# Patient Record
Sex: FEMALE | Race: WHITE | Employment: FULL TIME | URBAN - METROPOLITAN AREA
[De-identification: names, ages, dates, MRNs, and addresses within clinical notes are randomized per-mention and may not be internally consistent; named-entity substitution may affect disease eponyms.]

---

## 2017-02-06 ENCOUNTER — TELEPHONE (OUTPATIENT)
Dept: FAMILY MEDICINE | Facility: CLINIC | Age: 62
End: 2017-02-06

## 2017-02-06 NOTE — TELEPHONE ENCOUNTER
Reason for Call:  Form, our goal is to have forms completed with 72 hours, however, some forms may require a visit or additional information.    Type of letter, form or note:  medical    Who is the form from?: [facility]    Where did the form come from: form was faxed in    What clinic location was the form placed at?: [facility]    Where the form was placed: 's Box: Jose Roberto Gonzalez MD    What number is listed as a contact on the form?: Call Patient at [phone] to clarify where this form is to be sent or if she would like to pick it up?       Additional comments: Physician Statement - Ability to Work    Call taken on 2/6/2017 at 12:03 PM by Yfn Min

## 2017-02-27 DIAGNOSIS — E03.4 HYPOTHYROIDISM DUE TO ACQUIRED ATROPHY OF THYROID: ICD-10-CM

## 2017-02-28 RX ORDER — LEVOTHYROXINE SODIUM 175 UG/1
TABLET ORAL
Qty: 30 TABLET | Refills: 0 | Status: SHIPPED | OUTPATIENT
Start: 2017-02-28 | End: 2017-03-31

## 2017-02-28 NOTE — TELEPHONE ENCOUNTER
evothyroxine (SYNTHROID/LEVOTHROID) 175 MCG tablet     Last Written Prescription Date: 7/28/2016  Last Quantity: 90, # refills: 1  Last Office Visit with [facility], [facility] or [facility] prescribing provider: 4/18/2016        TSH   Date Value Ref Range Status   02/19/2016 1.04 0.40 - 5.00 mU/L Final

## 2017-03-31 DIAGNOSIS — E03.4 HYPOTHYROIDISM DUE TO ACQUIRED ATROPHY OF THYROID: ICD-10-CM

## 2017-03-31 NOTE — TELEPHONE ENCOUNTER
levothyroxine (SYNTHROID/LEVOTHROID) 175 MCG tablet     Last Written Prescription Date: 2/28/17  Last Quantity: 30, # refills: 0  Last Office Visit with [facility], [facility] or [facility] prescribing provider: 4/18/16        TSH   Date Value Ref Range Status   02/19/2016 1.04 0.40 - 5.00 mU/L Final

## 2017-04-03 ENCOUNTER — TELEPHONE (OUTPATIENT)
Dept: NURSING | Facility: CLINIC | Age: 62
End: 2017-04-03

## 2017-04-03 NOTE — TELEPHONE ENCOUNTER
Patient had vadim refill. She is due for a physical  I left a non detailed message for her to call back.  Debby Ruano RN- Triage [employer]

## 2017-04-03 NOTE — TELEPHONE ENCOUNTER
"Call Type: Triage Call    Presenting Problem: \"I am returning a call from the clinic.\" Per  epic:Patient had vadim refill. She is due for a physical  I left a  non detailed message for her to call back.  Debby Ruano RN- Triage  [employer]  I advised appt.   ?Per pt.\" Well that's not going to  happen, I'll just have to be tired and fat, I don't have insurance  and that's not happening.\"  Triage Note:  Guideline Title: Information Only Call; No Symptom Triage (Adult)  Recommended Disposition: Call Provider When Office is Open  Original Inclination: Would have called clinic  Override Disposition:  Intended Action: Call PCP/HCP  Physician Contacted: No  Requesting information and provider is best resource; no triage required. ?  YES  Requesting regular office appointment ? NO  Sign(s) or symptom(s) associated with a diagnosed condition or with a new illness  ? NO  Requesting information about provider, services or community resources ? NO  Call back to complete assessment/clarification of information from prior caller to  complete triage ? NO  Physician Instructions:  Care Advice:  "

## 2017-04-05 NOTE — TELEPHONE ENCOUNTER
"Clinic Action Needed:Yes, follow up with patient    Reason for Call: \"I'm returning Debby's call from clinic\". Advised Angela that clinic was calling her regarding her refill request on Thyroid medication. Explained that she received one vadim refill, however she is due for a physical in order to get a script for her medication. Angela became very agitated and belligerent stating that she simply cannot afford to come into the clinic and she will not do it.  She said that she is \"homeless and cannot take it anymore\". She also made a statement that \"I will drive my car into the [place]\". Before I could say anything further, she hung up.     Routed to: [facility] Patient Care Team 2    Carolyn Ricardo RN  [facility]        "

## 2017-04-10 RX ORDER — LEVOTHYROXINE SODIUM 175 UG/1
TABLET ORAL
Qty: 30 TABLET | Refills: 0 | Status: SHIPPED | OUTPATIENT
Start: 2017-04-10 | End: 2017-10-23

## 2017-04-10 NOTE — TELEPHONE ENCOUNTER
Routing refill request to provider for review/approval because:  Labs not current:  And last OV April 2016 but please read patient comment with last call.  Thank you

## 2017-04-26 ENCOUNTER — TELEPHONE (OUTPATIENT)
Dept: FAMILY MEDICINE | Facility: CLINIC | Age: 62
End: 2017-04-26

## 2017-04-26 NOTE — TELEPHONE ENCOUNTER
Panel Management Review      Patient has the following on her problem list:     Asthma review     ACT Total Scores 1/22/2016   ACT TOTAL SCORE -   ASTHMA ER VISITS -   ASTHMA HOSPITALIZATIONS -   ACT TOTAL SCORE (Goal Greater than or Equal to 20) 8   In the past 12 months, how many times did you visit the emergency room for your asthma without being admitted to the hospital? 0   In the past 12 months, how many times were you hospitalized overnight because of your asthma? 0      1. Is Asthma diagnosis on the Problem List? Yes    2. Is Asthma listed on Health Maintenance? Yes    3. Patient is due for:  ACT      Composite cancer screening  Chart review shows that this patient is due/due soon for the following Pap Smear  Summary:    Patient is due/failing the following:   ACT and PAP    Action needed:   Patient needs office visit for PAP. and Patient needs to do ACT.    Type of outreach:    Sent [employer] message and mailed letter.    Questions for provider review:    None                                                                                                                                    Princess FLORENCIO Bosch CMA

## 2017-04-26 NOTE — LETTER
[facility]  [address]  [place] [id number] [phone]                                                                                                           Angela Salguero  [address] [place] [address]    April 26, 2017          Dear Angela Salguero,      We care about your well-being!  In order to ensure we are providing the best quality care, we have reviewed your chart and see that you are due for:     __X__ Physical   __X__ Pap Smear   _____ Mammogram   _____ Diabetes Followup   _____ Hypertension Followup   _____ Cholesterol Followup (Provider Appointment)   _____ Cholesterol Test (Lab-Only Appointment)   __X__ Other: follow up asthma       We can assist you in scheduling these appointments at [phone].    If you have had (or plan to have) any of these tests done at a facility other than [facility] or a [facility], please have the results from these tests sent to your primary physician at [facility].       Sincerely,    Jose Roberto Gonzalez MD

## 2017-05-04 ENCOUNTER — OFFICE VISIT (OUTPATIENT)
Dept: FAMILY MEDICINE | Facility: CLINIC | Age: 62
End: 2017-05-04

## 2017-05-04 VITALS
OXYGEN SATURATION: 97 % | TEMPERATURE: 98.2 F | HEART RATE: 77 BPM | DIASTOLIC BLOOD PRESSURE: 92 MMHG | SYSTOLIC BLOOD PRESSURE: 160 MMHG | RESPIRATION RATE: 15 BRPM

## 2017-05-04 DIAGNOSIS — R30.0 DYSURIA: Primary | ICD-10-CM

## 2017-05-04 DIAGNOSIS — N18.30 CKD (CHRONIC KIDNEY DISEASE) STAGE 3, GFR 30-59 ML/MIN (H): ICD-10-CM

## 2017-05-04 DIAGNOSIS — I10 ESSENTIAL HYPERTENSION, BENIGN: ICD-10-CM

## 2017-05-04 DIAGNOSIS — R10.84 ABDOMINAL PAIN, GENERALIZED: ICD-10-CM

## 2017-05-04 LAB
ALBUMIN UR-MCNC: ABNORMAL MG/DL
APPEARANCE UR: CLEAR
BASOPHILS # BLD AUTO: 0 10E9/L (ref 0–0.2)
BASOPHILS NFR BLD AUTO: 0.3 %
BILIRUB UR QL STRIP: NEGATIVE
COLOR UR AUTO: YELLOW
DIFFERENTIAL METHOD BLD: ABNORMAL
EOSINOPHIL # BLD AUTO: 0.6 10E9/L (ref 0–0.7)
EOSINOPHIL NFR BLD AUTO: 4.5 %
ERYTHROCYTE [DISTWIDTH] IN BLOOD BY AUTOMATED COUNT: 13.5 % (ref 10–15)
ERYTHROCYTE [SEDIMENTATION RATE] IN BLOOD BY WESTERGREN METHOD: 4 MM/H (ref 0–30)
GLUCOSE UR STRIP-MCNC: NEGATIVE MG/DL
HCT VFR BLD AUTO: 45.1 % (ref 35–47)
HGB BLD-MCNC: 14.9 G/DL (ref 11.7–15.7)
HGB UR QL STRIP: ABNORMAL
KETONES UR STRIP-MCNC: NEGATIVE MG/DL
LEUKOCYTE ESTERASE UR QL STRIP: NEGATIVE
LYMPHOCYTES # BLD AUTO: 4.5 10E9/L (ref 0.8–5.3)
LYMPHOCYTES NFR BLD AUTO: 32.7 %
MCH RBC QN AUTO: 33.9 PG (ref 26.5–33)
MCHC RBC AUTO-ENTMCNC: 33 G/DL (ref 31.5–36.5)
MCV RBC AUTO: 103 FL (ref 78–100)
MONOCYTES # BLD AUTO: 1 10E9/L (ref 0–1.3)
MONOCYTES NFR BLD AUTO: 7.5 %
NEUTROPHILS # BLD AUTO: 7.6 10E9/L (ref 1.6–8.3)
NEUTROPHILS NFR BLD AUTO: 55 %
NITRATE UR QL: NEGATIVE
NON-SQ EPI CELLS #/AREA URNS LPF: ABNORMAL /LPF
PH UR STRIP: 5.5 PH (ref 5–7)
PLATELET # BLD AUTO: 262 10E9/L (ref 150–450)
RBC # BLD AUTO: 4.39 10E12/L (ref 3.8–5.2)
RBC #/AREA URNS AUTO: ABNORMAL /HPF (ref 0–2)
SP GR UR STRIP: 1.02 (ref 1–1.03)
URN SPEC COLLECT METH UR: ABNORMAL
UROBILINOGEN UR STRIP-ACNC: 1 EU/DL (ref 0.2–1)
WBC # BLD AUTO: 13.8 10E9/L (ref 4–11)
WBC #/AREA URNS AUTO: ABNORMAL /HPF (ref 0–2)

## 2017-05-04 PROCEDURE — 81001 URINALYSIS AUTO W/SCOPE: CPT | Performed by: PHYSICIAN ASSISTANT

## 2017-05-04 PROCEDURE — 87086 URINE CULTURE/COLONY COUNT: CPT | Performed by: PHYSICIAN ASSISTANT

## 2017-05-04 PROCEDURE — 86140 C-REACTIVE PROTEIN: CPT | Performed by: PHYSICIAN ASSISTANT

## 2017-05-04 PROCEDURE — 99214 OFFICE O/P EST MOD 30 MIN: CPT | Performed by: PHYSICIAN ASSISTANT

## 2017-05-04 PROCEDURE — 36415 COLL VENOUS BLD VENIPUNCTURE: CPT | Performed by: PHYSICIAN ASSISTANT

## 2017-05-04 PROCEDURE — 80053 COMPREHEN METABOLIC PANEL: CPT | Performed by: PHYSICIAN ASSISTANT

## 2017-05-04 PROCEDURE — 85652 RBC SED RATE AUTOMATED: CPT | Performed by: PHYSICIAN ASSISTANT

## 2017-05-04 PROCEDURE — 85025 COMPLETE CBC W/AUTO DIFF WBC: CPT | Performed by: PHYSICIAN ASSISTANT

## 2017-05-04 RX ORDER — CIPROFLOXACIN 500 MG/1
500 TABLET, FILM COATED ORAL 2 TIMES DAILY
Qty: 10 TABLET | Refills: 0 | Status: SHIPPED | OUTPATIENT
Start: 2017-05-04 | End: 2017-05-09

## 2017-05-04 NOTE — PROGRESS NOTES
SUBJECTIVE:                                                    Angela Salguero is a 61 year old female who presents to clinic today for the following health issues:    URINARY TRACT SYMPTOMS     Onset: 2 wks?    Description:   Painful urination (Dysuria): no   Blood in urine (Hematuria): no   Delay in urine (Hesitency): no     Intensity: moderate    Progression of Symptoms:  worsening    Accompanying Signs & Symptoms:  Fever/chills: no   Flank pain YES- LBP, left side  Nausea and vomiting: YES- nausea  Any vaginal symptoms: none; urine odor  Abdominal/Pelvic Pain: yes-burning    History:   History of frequent UTI's: YES  History of kidney stones: YES- possibly, has kidney infection years ago  Sexually Active: no   Possibility of pregnancy: No    Precipitating factors:   n/a         Therapies Tried and outcome: [facility] clinic was turned away due to severity of symptoms.     LBP that is bilateral and acute on chronic in the past few days without flank pain. She describes a burning sensation in the bilateral lower abdomen that is worse immediately following urination. She denies symptoms of diarrhea, constipation, pain before or following BM, nausea, vomiting. She notes very malodorous urine and darkened color over the past few days also.         ROS:  C: Negative for fever, chills, recent change in weight  Skin: Negative for worrisome rashes or lesions  Resp: Negative for significant cough or SOB  CV: Negative for chest pain or peripheral edema  GI: Negative for nausea, abdominal pain, heartburn, or change in bowel habits  MS: Negative for significant arthralgias or myalgias      PFSH: hx previous UTI.        Patient Active Problem List   Diagnosis     COPD (chronic obstructive pulmonary disease): spirometry FEV1/FVC= 82% 7-17-14     GERD (gastroesophageal reflux disease)     Essential hypertension, benign     Hypothyroidism due to acquired atrophy of thyroid     Sinusitis, chronic     Atopic rhinitis      Personal history of tobacco use, presenting hazards to health-[age]/o @ 1ppd=40 pk yr hx-conts in 4-16      Hypercholesterolemia     CKD (chronic kidney disease) stage 3, GFR 30-59 ml/min     Current Outpatient Prescriptions   Medication     ciprofloxacin (CIPRO) 500 MG tablet     levothyroxine (SYNTHROID/LEVOTHROID) 175 MCG tablet     lisinopril (PRINIVIL,ZESTRIL) 20 MG tablet     amLODIPine (NORVASC) 5 MG tablet     ipratropium - albuterol 0.5 mg/2.5 mg/3 mL (DUONEB) 0.5-2.5 (3) MG/3ML nebulization     albuterol (2.5 MG/3ML) 0.083% nebulizer solution     mometasone-formoterol (DULERA) 200-5 MCG/ACT oral inhaler     albuterol (PROAIR HFA, PROVENTIL HFA, VENTOLIN HFA) 108 (90 BASE) MCG/ACT inhaler     fluticasone (FLONASE) 50 MCG/ACT nasal spray     loratadine (CLARITIN) 10 MG tablet     No current facility-administered medications for this visit.        OBJECTIVE:                                                    BP (!) 160/92 (BP Location: Left arm, Patient Position: Chair, Cuff Size: Adult Regular)  Pulse 77  Temp 98.2  F (36.8  C) (Tympanic)  Resp 15  LMP  (LMP Unknown)  SpO2 97%  There is no height or weight on file to calculate BMI.  GENERAL: healthy, alert, in no acute distress  HENT: Mucous mebranes moist.  RESP: lungs clear to auscultation - no rales, no rhonchi, no wheezes  CV: regular rate and rhythm, normal S1 S2.  No peripheral edema.  ABDOMEN: soft, nontender, no hepatosplenomegaly or masses. Normal bowel sounds in all four quadrants.  MS: no gross deformities noted.  No CVA tenderness. No paralumbar tenderness.  SKIN: no suspicious lesions, no rashes  PSYCH: Alert and oriented times 3;  Able to articulate logical thoughts. Affect is normal.    Diagnostic test results:   Results for orders placed or performed in visit on 05/04/17 (from the past 24 hour(s))   UA reflex to Microscopic   Result Value Ref Range    Color Urine Yellow     Appearance Urine Clear     Glucose Urine Negative NEG mg/dL     Bilirubin Urine Negative NEG    Ketones Urine Negative NEG mg/dL    Specific Gravity Urine 1.025 1.003 - 1.035    Blood Urine Trace (A) NEG    pH Urine 5.5 5.0 - 7.0 pH    Protein Albumin Urine Trace (A) NEG mg/dL    Urobilinogen Urine 1.0 0.2 - 1.0 EU/dL    Nitrite Urine Negative NEG    Leukocyte Esterase Urine Negative NEG    Source Midstream Urine    Urine Microscopic   Result Value Ref Range    WBC Urine O - 2 0 - 2 /HPF    RBC Urine 2-5 (A) 0 - 2 /HPF    Squamous Epithelial /LPF Urine Few FEW /LPF   CBC with platelets and differential   Result Value Ref Range    WBC 13.8 (H) 4.0 - 11.0 10e9/L    RBC Count 4.39 3.8 - 5.2 10e12/L    Hemoglobin 14.9 11.7 - 15.7 g/dL    Hematocrit 45.1 35.0 - 47.0 %     (H) 78 - 100 fl    MCH 33.9 (H) 26.5 - 33.0 pg    MCHC 33.0 31.5 - 36.5 g/dL    RDW 13.5 10.0 - 15.0 %    Platelet Count 262 150 - 450 10e9/L    Diff Method Automated Method     % Neutrophils 55.0 %    % Lymphocytes 32.7 %    % Monocytes 7.5 %    % Eosinophils 4.5 %    % Basophils 0.3 %    Absolute Neutrophil 7.6 1.6 - 8.3 10e9/L    Absolute Lymphocytes 4.5 0.8 - 5.3 10e9/L    Absolute Monocytes 1.0 0.0 - 1.3 10e9/L    Absolute Eosinophils 0.6 0.0 - 0.7 10e9/L    Absolute Basophils 0.0 0.0 - 0.2 10e9/L   ESR: Erythrocyte sedimentation rate   Result Value Ref Range    Sed Rate 4 0 - 30 mm/h        ASSESSMENT/PLAN:                                                        ICD-10-CM    1. Dysuria R30.0 UA reflex to Microscopic     Urine Microscopic     ciprofloxacin (CIPRO) 500 MG tablet   2. CKD (chronic kidney disease) stage 3, GFR 30-59 ml/min N18.3    3. Essential hypertension, benign I10    4. Abdominal pain, generalized R10.84 Urine Culture Aerobic Bacterial     Comprehensive metabolic panel     CBC with platelets and differential     CRP inflammation     ESR: Erythrocyte sedimentation rate     Vitals are stable and there is no CVA tenderness, complaint is bilateral low back. Her pelvic symptoms are not  typical of UTI, however with the malodorous urine subjective information we will treat for UTI pending further evaluation. Treatment for UTI pending further labs. She will be seen in the ED if symptoms worsen.     Nicole Joy Siegler, PA-C  [facility]

## 2017-05-04 NOTE — NURSING NOTE
"Chief Complaint   Patient presents with     UTI       Initial BP (!) 160/92 (BP Location: Left arm, Patient Position: Chair, Cuff Size: Adult Regular)  Pulse 77  Temp 98.2  F (36.8  C) (Tympanic)  Resp 15  LMP  (LMP Unknown)  SpO2 97% Estimated body mass index is 33.84 kg/(m^2) as calculated from the following:    Height as of 4/18/16: 5' 2\" (1.575 m).    Weight as of 4/18/16: 185 lb (83.9 kg).  Medication Reconciliation: complete    "

## 2017-05-04 NOTE — MR AVS SNAPSHOT
After Visit Summary   5/4/2017    Angela Salguero    MRN: [id number]           Patient Information     Date Of Birth          1955        Visit Information        Provider Department      5/4/2017 4:30 PM Siegler, Nicole Joy, PA-C [facility]        Today's Diagnoses     Dysuria    -  1    CKD (chronic kidney disease) stage 3, GFR 30-59 ml/min        Essential hypertension, benign        Abdominal pain, generalized           Follow-ups after your visit        Who to contact     If you have questions or need follow up information about today's clinic visit or your schedule please contact [facility] directly at [phone].  Normal or non-critical lab and imaging results will be communicated to you by [employer], letter or phone within 4 business days after the clinic has received the results. If you do not hear from us within 7 days, please contact the clinic through [employer] or phone. If you have a critical or abnormal lab result, we will notify you by phone as soon as possible.  Submit refill requests through [employer] or call your pharmacy and they will forward the refill request to us. Please allow 3 business days for your refill to be completed.          Additional Information About Your Visit        MyChart Information     [employer] gives you secure access to your electronic health record. If you see a primary care provider, you can also send messages to your care team and make appointments. If you have questions, please call your primary care clinic.  If you do not have a primary care provider, please call [phone] and they will assist you.        Care EveryWhere ID     This is your Care EveryWhere ID. This could be used by other organizations to access your [facility] medical records  [id number]        Your Vitals Were     Pulse Temperature Respirations Last Period Pulse Oximetry       77 98.2  F (36.8  C) (Tympanic) 15 (LMP  Unknown) 97%        Blood Pressure from Last 3 Encounters:   05/04/17 (!) 160/92   04/18/16 130/82   04/08/16 130/78    Weight from Last 3 Encounters:   04/18/16 185 lb (83.9 kg)   04/08/16 185 lb (83.9 kg)   02/29/16 185 lb (83.9 kg)              We Performed the Following     CBC with platelets and differential     Comprehensive metabolic panel     CRP inflammation     ESR: Erythrocyte sedimentation rate     UA reflex to Microscopic     Urine Culture Aerobic Bacterial     Urine Microscopic          Today's Medication Changes          These changes are accurate as of: 5/4/17  5:11 PM.  If you have any questions, ask your nurse or doctor.               Start taking these medicines.        Dose/Directions    ciprofloxacin 500 MG tablet   Commonly known as:  CIPRO   Used for:  Dysuria   Started by:  Siegler, Nicole Joy, PA-C        Dose:  500 mg   Take 1 tablet (500 mg) by mouth 2 times daily for 5 days   Quantity:  10 tablet   Refills:  0            Where to get your medicines      These medications were sent to [place] [address] [place] [facility] [address]  [address] [address]     Phone:  [phone]     ciprofloxacin 500 MG tablet                Primary Care Provider Office Phone # Fax #    Jose Roberto Gonzalez [phone]       [facility] [address] [facility] [address]        Thank you!     Thank you for choosing [facility]  for your care. Our goal is always to provide you with excellent care. Hearing back from our patients is one way we can continue to improve our services. Please take a few minutes to complete the written survey that you may receive in the mail after your visit with us. Thank you!             Your Updated Medication List - Protect others around you: Learn how to safely use, store and throw away your medicines at www.disposemymeds.org.          This list is accurate as of: 5/4/17  5:11 PM.  Always use your  most recent med list.                   Brand Name Dispense Instructions for use    * albuterol (2.5 MG/3ML) 0.083% neb solution     2 Box    INHALE ONE VIAL VIA NEBULIZER THREE TIMES DAILY AS NEEDED       * albuterol 108 (90 BASE) MCG/ACT Inhaler    PROAIR HFA/PROVENTIL HFA/VENTOLIN HFA    1 Inhaler    Inhale 2 puffs into the lungs every 6 hours       amLODIPine 5 MG tablet    NORVASC    90 tablet    TAKE ONE TABLET BY MOUTH ONE TIME DAILY       ciprofloxacin 500 MG tablet    CIPRO    10 tablet    Take 1 tablet (500 mg) by mouth 2 times daily for 5 days       CLARITIN 10 MG tablet   Generic drug:  loratadine      Take 10 mg by mouth daily.       fluticasone 50 MCG/ACT spray    FLONASE    1 Package    Spray 2 sprays into both nostrils daily       ipratropium - albuterol 0.5 mg/2.5 mg/3 mL 0.5-2.5 (3) MG/3ML neb solution    DUONEB    100 vial    Take 1 vial (3 mLs) by nebulization every 6 hours as needed for shortness of breath / dyspnea or wheezing       levothyroxine 175 MCG tablet    SYNTHROID/LEVOTHROID    30 tablet    TAKE ONE TABLET BY MOUTH ONE TIME DAILY       lisinopril 20 MG tablet    PRINIVIL/ZESTRIL    30 tablet    TAKE ONE TABLET BY MOUTH TWICE DAILY. NEEDS TO MAKE APPOINTMENT FOR MORE REFILLS       mometasone-formoterol 200-5 MCG/ACT oral inhaler    DULERA    1 Inhaler    Inhale 2 puffs into the lungs 2 times daily       * Notice:  This list has 2 medication(s) that are the same as other medications prescribed for you. Read the directions carefully, and ask your doctor or other care provider to review them with you.

## 2017-05-05 DIAGNOSIS — I10 ESSENTIAL HYPERTENSION, BENIGN: ICD-10-CM

## 2017-05-05 LAB
ALBUMIN SERPL-MCNC: 3.7 G/DL (ref 3.4–5)
ALP SERPL-CCNC: 116 U/L (ref 40–150)
ALT SERPL W P-5'-P-CCNC: 21 U/L (ref 0–50)
ANION GAP SERPL CALCULATED.3IONS-SCNC: 6 MMOL/L (ref 3–14)
AST SERPL W P-5'-P-CCNC: 7 U/L (ref 0–45)
BILIRUB SERPL-MCNC: 0.5 MG/DL (ref 0.2–1.3)
BUN SERPL-MCNC: 27 MG/DL (ref 7–30)
CALCIUM SERPL-MCNC: 8.6 MG/DL (ref 8.5–10.1)
CHLORIDE SERPL-SCNC: 111 MMOL/L (ref 94–109)
CO2 SERPL-SCNC: 28 MMOL/L (ref 20–32)
CREAT SERPL-MCNC: 1.2 MG/DL (ref 0.52–1.04)
CRP SERPL-MCNC: 6.8 MG/L (ref 0–8)
GFR SERPL CREATININE-BSD FRML MDRD: 46 ML/MIN/1.7M2
GLUCOSE SERPL-MCNC: 102 MG/DL (ref 70–99)
POTASSIUM SERPL-SCNC: 4 MMOL/L (ref 3.4–5.3)
PROT SERPL-MCNC: 6.6 G/DL (ref 6.8–8.8)
SODIUM SERPL-SCNC: 145 MMOL/L (ref 133–144)

## 2017-05-05 NOTE — TELEPHONE ENCOUNTER
I LVM for Angela with her lab results and we are pending urine culture. She was asked to call and update us on her condition as we wait for the urine culture. Nicole Joy Siegler, PA-C

## 2017-05-05 NOTE — LETTER
[facility]  [address]  [address]  [facility] [place] [facility] [id number] [phone]                                                                                                           Angela Salguero  [address]  [place] [address]    May 10, 2017          Dear Angela Salguero,    Your urine culture did not grow bacteria, if symptoms persist please return to clinic for further evaluation.    Results for orders placed or performed in visit on 05/04/17   UA reflex to Microscopic   Result Value Ref Range    Color Urine Yellow     Appearance Urine Clear     Glucose Urine Negative NEG mg/dL    Bilirubin Urine Negative NEG    Ketones Urine Negative NEG mg/dL    Specific Gravity Urine 1.025 1.003 - 1.035    Blood Urine Trace (A) NEG    pH Urine 5.5 5.0 - 7.0 pH    Protein Albumin Urine Trace (A) NEG mg/dL    Urobilinogen Urine 1.0 0.2 - 1.0 EU/dL    Nitrite Urine Negative NEG    Leukocyte Esterase Urine Negative NEG    Source Midstream Urine    Urine Microscopic   Result Value Ref Range    WBC Urine O - 2 0 - 2 /HPF    RBC Urine 2-5 (A) 0 - 2 /HPF    Squamous Epithelial /LPF Urine Few FEW /LPF   Comprehensive metabolic panel   Result Value Ref Range    Sodium 145 (H) 133 - 144 mmol/L    Potassium 4.0 3.4 - 5.3 mmol/L    Chloride 111 (H) 94 - 109 mmol/L    Carbon Dioxide 28 20 - 32 mmol/L    Anion Gap 6 3 - 14 mmol/L    Glucose 102 (H) 70 - 99 mg/dL    Urea Nitrogen 27 7 - 30 mg/dL    Creatinine 1.20 (H) 0.52 - 1.04 mg/dL    GFR Estimate 46 (L) >60 mL/min/1.7m2    GFR Estimate If Black 55 (L) >60 mL/min/1.7m2    Calcium 8.6 8.5 - 10.1 mg/dL    Bilirubin Total 0.5 0.2 - 1.3 mg/dL    Albumin 3.7 3.4 - 5.0 g/dL    Protein Total 6.6 (L) 6.8 - 8.8 g/dL    Alkaline Phosphatase 116 40 - 150 U/L    ALT 21 0 - 50 U/L    AST 7 0 - 45 U/L   CBC with platelets and differential   Result Value Ref Range    WBC 13.8 (H) 4.0 - 11.0 10e9/L    RBC Count 4.39 3.8  - 5.2 10e12/L    Hemoglobin 14.9 11.7 - 15.7 g/dL    Hematocrit 45.1 35.0 - 47.0 %     (H) 78 - 100 fl    MCH 33.9 (H) 26.5 - 33.0 pg    MCHC 33.0 31.5 - 36.5 g/dL    RDW 13.5 10.0 - 15.0 %    Platelet Count 262 150 - 450 10e9/L    Diff Method Automated Method     % Neutrophils 55.0 %    % Lymphocytes 32.7 %    % Monocytes 7.5 %    % Eosinophils 4.5 %    % Basophils 0.3 %    Absolute Neutrophil 7.6 1.6 - 8.3 10e9/L    Absolute Lymphocytes 4.5 0.8 - 5.3 10e9/L    Absolute Monocytes 1.0 0.0 - 1.3 10e9/L    Absolute Eosinophils 0.6 0.0 - 0.7 10e9/L    Absolute Basophils 0.0 0.0 - 0.2 10e9/L   CRP inflammation   Result Value Ref Range    CRP Inflammation 6.8 0.0 - 8.0 mg/L   ESR: Erythrocyte sedimentation rate   Result Value Ref Range    Sed Rate 4 0 - 30 mm/h   Urine Culture Aerobic Bacterial   Result Value Ref Range    Specimen Description Midstream Urine     Culture Micro No growth     Micro Report Status FINAL 05/06/2017          Sincerely,    Nicole Siegler, PA

## 2017-05-06 LAB
BACTERIA SPEC CULT: NO GROWTH
MICRO REPORT STATUS: NORMAL
SPECIMEN SOURCE: NORMAL

## 2017-05-06 ASSESSMENT — ASTHMA QUESTIONNAIRES: ACT_TOTALSCORE: 12

## 2017-05-09 NOTE — TELEPHONE ENCOUNTER
Urine culture was negative; did not grow any bacteria. We should discuss whether she has symptoms continuing. Nicole Joy Siegler, PA-C

## 2017-05-10 NOTE — TELEPHONE ENCOUNTER
Please send patient a letter describing that her urine culture did not grow bacteria and that if her symptoms are persisting she should return to clinic for further evaluation. We have attempted to reach her twice. This encounter can be closed after production of the letter. Thanks! Nicole Joy Siegler, PA-C

## 2017-05-16 NOTE — TELEPHONE ENCOUNTER
"Angela is calling today for labs. Tita has not looked at them yet.   She has been off her antibiotics for UTI since the 10th  The urinary burning and frequency have decreased.  The odor has resolved.  She has a swollen lymph node and it is very tender.  Right by public bone, top of thigh.  She does not know how long it has james there.   She is feeling much better but she is calling today with concern over lymph node.   She is taking a 7 day Monistat for ? Yeast infection. \"I'm not sure if I have one\".   She is on her 3rd day.      Triage call [phone] with Tita's advice  Debby Ruano RN- Triage [employer]      "

## 2017-05-16 NOTE — TELEPHONE ENCOUNTER
I did try contacting her with lab results. They were not clinically significant for further concern except slightly reduced kidney function which she has had in previous tests. I would like her to return to clinic for further discussion if she continues to have symptoms or NEW symptoms, which it seems she does have (concern for lymph node swelling) and require evaluation.   Please notify her.

## 2017-05-17 NOTE — TELEPHONE ENCOUNTER
Pt was informed of providers message. States she does not have insurance and cannot afford OV. Would like PCP to know she had CMP-kidney test done. Asked if PCP would like her to continue taking lisinopril. Ok to leave a detailed message with provider response.

## 2017-05-18 NOTE — TELEPHONE ENCOUNTER
Called patient on mobile and left detailed message as described below.  Mirian Carroll RN  05/18/17  9:49 AM

## 2017-05-18 NOTE — TELEPHONE ENCOUNTER
Kidney function test did show moderate decrease. Ok to continue with current Lisinopril dose but we should not increase

## 2017-05-19 RX ORDER — LISINOPRIL 20 MG/1
TABLET ORAL
Qty: 90 TABLET | Refills: 0 | Status: SHIPPED | OUTPATIENT
Start: 2017-05-19 | End: 2017-08-14

## 2017-05-19 NOTE — TELEPHONE ENCOUNTER
Patient called requesting refill on lisinopril.       lisinopril (PRINIVIL,ZESTRIL) 20 MG tablet  Last Written Prescription Date: 11/21/2016  Last Fill Quantity: 30, # refills: 0  Last Office Visit with G, P or [facility] prescribing provider: 05/04/2017       Potassium   Date Value Ref Range Status   05/04/2017 4.0 3.4 - 5.3 mmol/L Final     Creatinine   Date Value Ref Range Status   05/04/2017 1.20 (H) 0.52 - 1.04 mg/dL Final     BP Readings from Last 3 Encounters:   05/04/17 (!) 160/92   04/18/16 130/82   04/08/16 130/78     Routing refill request to provider for review/approval because:  Break on medication   pleaseadvice on dose and qty of refills approved.

## 2017-08-14 DIAGNOSIS — I10 ESSENTIAL HYPERTENSION, BENIGN: ICD-10-CM

## 2017-08-14 RX ORDER — LISINOPRIL 20 MG/1
TABLET ORAL
Qty: 90 TABLET | Refills: 3 | Status: SHIPPED | OUTPATIENT
Start: 2017-08-14 | End: 2019-03-01

## 2017-10-23 DIAGNOSIS — E03.4 HYPOTHYROIDISM DUE TO ACQUIRED ATROPHY OF THYROID: ICD-10-CM

## 2017-10-24 RX ORDER — LEVOTHYROXINE SODIUM 175 UG/1
TABLET ORAL
Qty: 30 TABLET | Refills: 0 | Status: SHIPPED | OUTPATIENT
Start: 2017-10-24 | End: 2018-03-23

## 2018-02-21 ENCOUNTER — CARE COORDINATION (OUTPATIENT)
Dept: CARE COORDINATION | Facility: CLINIC | Age: 63
End: 2018-02-21

## 2018-02-21 DIAGNOSIS — R30.0 DYSURIA: Primary | ICD-10-CM

## 2018-02-21 NOTE — PROGRESS NOTES
"Clinic Care Coordination Contact        Patient calling. States he has urinary burning, urgency. Feeling \"miserable.\"  Patient has no insurance so is wondering if she can leave a urine specimen and if she has an infection could you please send an Rx  To [facility] in [place].     Sammie Bledsoe RN, [facility] - Care Coordinator     2/21/2018    2:24 PM  [phone]    "

## 2018-02-21 NOTE — PROGRESS NOTES
I put order into EPIC for a UA/UC.  She needs to give urine specimen then I can treat if the urine looks suspicious for infection

## 2018-02-22 ENCOUNTER — TELEPHONE (OUTPATIENT)
Dept: FAMILY MEDICINE | Facility: CLINIC | Age: 63
End: 2018-02-22

## 2018-02-22 DIAGNOSIS — R30.0 DYSURIA: ICD-10-CM

## 2018-02-22 LAB
ALBUMIN UR-MCNC: 30 MG/DL
APPEARANCE UR: ABNORMAL
BACTERIA #/AREA URNS HPF: ABNORMAL /HPF
BILIRUB UR QL STRIP: NEGATIVE
COLOR UR AUTO: YELLOW
GLUCOSE UR STRIP-MCNC: NEGATIVE MG/DL
HGB UR QL STRIP: NEGATIVE
KETONES UR STRIP-MCNC: NEGATIVE MG/DL
LEUKOCYTE ESTERASE UR QL STRIP: NEGATIVE
NITRATE UR QL: POSITIVE
NON-SQ EPI CELLS #/AREA URNS LPF: ABNORMAL /LPF
PH UR STRIP: 5.5 PH (ref 5–7)
RBC #/AREA URNS AUTO: ABNORMAL /HPF
SOURCE: ABNORMAL
SP GR UR STRIP: 1.02 (ref 1–1.03)
UROBILINOGEN UR STRIP-ACNC: 0.2 EU/DL (ref 0.2–1)
WBC #/AREA URNS AUTO: ABNORMAL /HPF

## 2018-02-22 PROCEDURE — 81001 URINALYSIS AUTO W/SCOPE: CPT | Performed by: FAMILY MEDICINE

## 2018-02-22 PROCEDURE — 87086 URINE CULTURE/COLONY COUNT: CPT | Performed by: FAMILY MEDICINE

## 2018-02-22 PROCEDURE — 87186 SC STD MICRODIL/AGAR DIL: CPT | Performed by: FAMILY MEDICINE

## 2018-02-24 LAB
BACTERIA SPEC CULT: ABNORMAL
BACTERIA SPEC CULT: ABNORMAL
SPECIMEN SOURCE: ABNORMAL

## 2018-02-24 RX ORDER — CIPROFLOXACIN 500 MG/1
500 TABLET, FILM COATED ORAL 2 TIMES DAILY
Qty: 14 TABLET | Refills: 0 | Status: SHIPPED | OUTPATIENT
Start: 2018-02-24 | End: 2019-03-01

## 2018-03-23 DIAGNOSIS — E03.4 HYPOTHYROIDISM DUE TO ACQUIRED ATROPHY OF THYROID: ICD-10-CM

## 2018-03-23 RX ORDER — LEVOTHYROXINE SODIUM 175 UG/1
175 TABLET ORAL DAILY
Qty: 30 TABLET | Refills: 1 | Status: SHIPPED | OUTPATIENT
Start: 2018-03-23 | End: 2019-03-01

## 2018-12-17 ENCOUNTER — TELEPHONE (OUTPATIENT)
Dept: FAMILY MEDICINE | Facility: CLINIC | Age: 63
End: 2018-12-17

## 2018-12-17 NOTE — TELEPHONE ENCOUNTER
Yes, I don't know if one of the other providers will be here late enough today to do it since I'll probably be gone and she probably doesn't want to talk to me since I denied her request.

## 2018-12-17 NOTE — TELEPHONE ENCOUNTER
"Patient reports she doesn's have access to use her phone or go to the library to use a computer. States she just lost her job and just wants to \"pee in a cup\". Expressed she is on a lot of pain and \"is ok if I die \". Pt hang up the call.   "

## 2018-12-17 NOTE — TELEPHONE ENCOUNTER
Reason for call:  Patient reporting a symptom    Symptom or request: uti    Duration (how long have symptoms been present): few days    Have you been treated for this before? Yes    Additional comments: pt would like to come in and leave a ua.  She does not want a dr appt has she currently has no insurance.       Phone Number patient can be reached at:  Home number on file [phone] (home)    Best Time:  asap    Can we leave a detailed message on this number:  YES    Call taken on 12/17/2018 at 11:28 AM by BRIAN MORENO

## 2018-12-17 NOTE — TELEPHONE ENCOUNTER
It is inappropriate to see her without an office visit as she needs an exam as well.  There are certain protocols we have to follow for her safety and just getting a urine sample without the information we get in a visit is not sufficient.

## 2018-12-17 NOTE — TELEPHONE ENCOUNTER
Noted. Writer suggested [facility] or do e-visit before call was disconnected but she declined. Routing message to care coordinator for further advice on resources.

## 2018-12-17 NOTE — TELEPHONE ENCOUNTER
Could she try [facility]? I believe you can access that without insurance and it is $49. I can also provide a short list of low cost clinics but they would probably be similar cost to [facility]. Would you like me to reach out?     She could try [facility] at [phone]  [facility] at [phone]  [facility] at [phone]    But [facility] closed at 4:30 and the others close at 5pm. Urgent Care[facility] or [facility] would be her best bet tonight.

## 2019-01-01 ENCOUNTER — OFFICE VISIT (OUTPATIENT)
Dept: FAMILY MEDICINE | Facility: CLINIC | Age: 64
End: 2019-01-01

## 2019-01-01 VITALS
TEMPERATURE: 99.1 F | RESPIRATION RATE: 16 BRPM | SYSTOLIC BLOOD PRESSURE: 148 MMHG | HEART RATE: 104 BPM | OXYGEN SATURATION: 95 % | DIASTOLIC BLOOD PRESSURE: 84 MMHG

## 2019-01-01 DIAGNOSIS — I10 ESSENTIAL HYPERTENSION, BENIGN: ICD-10-CM

## 2019-01-01 DIAGNOSIS — J01.00 ACUTE NON-RECURRENT MAXILLARY SINUSITIS: ICD-10-CM

## 2019-01-01 DIAGNOSIS — N30.00 ACUTE CYSTITIS WITHOUT HEMATURIA: ICD-10-CM

## 2019-01-01 DIAGNOSIS — J40 BRONCHITIS: Primary | ICD-10-CM

## 2019-01-01 LAB
ALBUMIN UR-MCNC: 100 MG/DL
APPEARANCE UR: ABNORMAL
BACTERIA #/AREA URNS HPF: ABNORMAL /HPF
BACTERIA SPEC CULT: ABNORMAL
BACTERIA SPEC CULT: ABNORMAL
BILIRUB UR QL STRIP: NEGATIVE
COLOR UR AUTO: YELLOW
GLUCOSE UR STRIP-MCNC: NEGATIVE MG/DL
HGB UR QL STRIP: ABNORMAL
KETONES UR STRIP-MCNC: 15 MG/DL
LEUKOCYTE ESTERASE UR QL STRIP: NEGATIVE
NITRATE UR QL: POSITIVE
PH UR STRIP: 5.5 PH (ref 5–7)
RBC #/AREA URNS AUTO: ABNORMAL /HPF
SOURCE: ABNORMAL
SP GR UR STRIP: >1.03 (ref 1–1.03)
SPECIMEN SOURCE: ABNORMAL
UROBILINOGEN UR STRIP-ACNC: 0.2 EU/DL (ref 0.2–1)
WBC #/AREA URNS AUTO: ABNORMAL /HPF

## 2019-01-01 PROCEDURE — 87086 URINE CULTURE/COLONY COUNT: CPT | Performed by: FAMILY MEDICINE

## 2019-01-01 PROCEDURE — 87186 SC STD MICRODIL/AGAR DIL: CPT | Performed by: FAMILY MEDICINE

## 2019-01-01 PROCEDURE — 99214 OFFICE O/P EST MOD 30 MIN: CPT | Performed by: FAMILY MEDICINE

## 2019-01-01 PROCEDURE — 81001 URINALYSIS AUTO W/SCOPE: CPT | Performed by: FAMILY MEDICINE

## 2019-01-01 PROCEDURE — 87088 URINE BACTERIA CULTURE: CPT | Performed by: FAMILY MEDICINE

## 2019-01-01 RX ORDER — CLINDAMYCIN HCL 150 MG
150 CAPSULE ORAL 3 TIMES DAILY
Qty: 30 CAPSULE | Refills: 0 | Status: SHIPPED | OUTPATIENT
Start: 2019-01-01 | End: 2019-01-01

## 2019-01-01 RX ORDER — DOXYCYCLINE 100 MG/1
100 CAPSULE ORAL 2 TIMES DAILY
Qty: 20 CAPSULE | Refills: 0 | Status: SHIPPED | OUTPATIENT
Start: 2019-01-01 | End: 2019-01-01

## 2019-01-01 RX ORDER — CIPROFLOXACIN 250 MG/1
250 TABLET, FILM COATED ORAL 2 TIMES DAILY
Qty: 10 TABLET | Refills: 0 | Status: SHIPPED | OUTPATIENT
Start: 2019-01-01 | End: 2019-01-01

## 2019-01-01 RX ORDER — PREDNISONE 20 MG/1
40 TABLET ORAL DAILY
Qty: 10 TABLET | Refills: 0 | Status: SHIPPED | OUTPATIENT
Start: 2019-01-01 | End: 2019-01-01

## 2019-03-01 ENCOUNTER — OFFICE VISIT (OUTPATIENT)
Dept: FAMILY MEDICINE | Facility: CLINIC | Age: 64
End: 2019-03-01

## 2019-03-01 VITALS
HEART RATE: 97 BPM | TEMPERATURE: 97.3 F | OXYGEN SATURATION: 98 % | RESPIRATION RATE: 20 BRPM | SYSTOLIC BLOOD PRESSURE: 144 MMHG | DIASTOLIC BLOOD PRESSURE: 110 MMHG

## 2019-03-01 DIAGNOSIS — J01.00 ACUTE MAXILLARY SINUSITIS, RECURRENCE NOT SPECIFIED: ICD-10-CM

## 2019-03-01 DIAGNOSIS — I10 ESSENTIAL HYPERTENSION, BENIGN: ICD-10-CM

## 2019-03-01 DIAGNOSIS — R30.0 DYSURIA: Primary | ICD-10-CM

## 2019-03-01 DIAGNOSIS — J44.9 CHRONIC OBSTRUCTIVE PULMONARY DISEASE, UNSPECIFIED COPD TYPE (H): ICD-10-CM

## 2019-03-01 DIAGNOSIS — E03.4 HYPOTHYROIDISM DUE TO ACQUIRED ATROPHY OF THYROID: ICD-10-CM

## 2019-03-01 DIAGNOSIS — R82.90 NONSPECIFIC FINDING ON EXAMINATION OF URINE: ICD-10-CM

## 2019-03-01 LAB
ALBUMIN UR-MCNC: >=300 MG/DL
APPEARANCE UR: CLEAR
BACTERIA #/AREA URNS HPF: ABNORMAL /HPF
BILIRUB UR QL STRIP: NEGATIVE
COLOR UR AUTO: YELLOW
GLUCOSE UR STRIP-MCNC: NEGATIVE MG/DL
HGB UR QL STRIP: NEGATIVE
KETONES UR STRIP-MCNC: NEGATIVE MG/DL
LEUKOCYTE ESTERASE UR QL STRIP: ABNORMAL
NITRATE UR QL: POSITIVE
NON-SQ EPI CELLS #/AREA URNS LPF: ABNORMAL /LPF
PH UR STRIP: >=9 PH (ref 5–7)
RBC #/AREA URNS AUTO: ABNORMAL /HPF
SOURCE: ABNORMAL
SP GR UR STRIP: 1.01 (ref 1–1.03)
UROBILINOGEN UR STRIP-ACNC: 0.2 EU/DL (ref 0.2–1)
WBC #/AREA URNS AUTO: ABNORMAL /HPF

## 2019-03-01 PROCEDURE — 99213 OFFICE O/P EST LOW 20 MIN: CPT | Performed by: FAMILY MEDICINE

## 2019-03-01 PROCEDURE — 81001 URINALYSIS AUTO W/SCOPE: CPT | Performed by: FAMILY MEDICINE

## 2019-03-01 PROCEDURE — 87086 URINE CULTURE/COLONY COUNT: CPT | Performed by: FAMILY MEDICINE

## 2019-03-01 PROCEDURE — 87186 SC STD MICRODIL/AGAR DIL: CPT | Performed by: FAMILY MEDICINE

## 2019-03-01 PROCEDURE — 87088 URINE BACTERIA CULTURE: CPT | Performed by: FAMILY MEDICINE

## 2019-03-01 RX ORDER — CIPROFLOXACIN 500 MG/1
500 TABLET, FILM COATED ORAL 2 TIMES DAILY
Qty: 28 TABLET | Refills: 0 | Status: SHIPPED | OUTPATIENT
Start: 2019-03-01 | End: 2019-03-25

## 2019-03-01 RX ORDER — LISINOPRIL 20 MG/1
TABLET ORAL
Qty: 90 TABLET | Refills: 3 | Status: SHIPPED | OUTPATIENT
Start: 2019-03-01 | End: 2020-01-01

## 2019-03-01 RX ORDER — LEVOTHYROXINE SODIUM 175 UG/1
175 TABLET ORAL DAILY
Qty: 30 TABLET | Refills: 1 | Status: SHIPPED | OUTPATIENT
Start: 2019-03-01 | End: 2019-05-09

## 2019-03-01 RX ORDER — AMLODIPINE BESYLATE 5 MG/1
5 TABLET ORAL DAILY
Qty: 30 TABLET | Refills: 1 | Status: SHIPPED | OUTPATIENT
Start: 2019-03-01 | End: 2019-01-01

## 2019-03-01 NOTE — PROGRESS NOTES
SUBJECTIVE:   Angela Salguero is a 63 year old female who presents to clinic today for the following health issues:    Pt had been originally scheduled to see Dr Tai today because my schedule was full.  My schedule opened up due to weather cancellations and she asked to be changed to my schedule    URINARY TRACT SYMPTOMS      Duration: since Nov    Description  dysuria, odor and back pain    Intensity:  moderate, severe    Accompanying signs and symptoms:  Fever/chills: no   Flank pain no   Nausea and vomiting: YES-vomiting  Vaginal symptoms: odor  Abdominal/Pelvic Pain: no     History  History of frequent UTI's: no   History of kidney stones: YES  Sexually Active: no   Possibility of pregnancy: No    Precipitating or alleviating factors: None    Therapies tried and outcome: none       RESPIRATORY SYMPTOMS      Duration: 2 months    Description  facial pain/pressure and wheezing    Severity: severe    Accompanying signs and symptoms: UTI    History (predisposing factors):  asthma and tobacco abuse    Precipitating or alleviating factors: None    Therapies tried and outcome:  Nebulizer, Inhaler, Vicks      Hypertension Follow-up      Outpatient blood pressures are not being checked.    Low Salt Diet: no added salt    Pt has been out of her BP medications for at least 3 mo     Asthma Follow-Up    Was ACT completed today?  No      Respiratory symptoms:   Cough: Yes-  worsening   Wheezing: Yes-  Several times daily    Shortness of breath: No    Use of short- acting(rescue) inhaler: yes, also using nebulizer    Taking controlled (daily) meds as prescribed: Yes    ER/UC visits or hospital admissions since last visit: none     Recent asthma triggers that patient is dealing with: upper respiratory infections and smoking     Hypothyroidism Follow-up      Since last visit, patient describes the following symptoms: weight gain of 5 lbs and fatigue    Pt has been out of her Levothyroxine for 2 mo       Amount of  exercise or physical activity: None    Problems taking medications regularly: Yes,  Has been out, did not have insurance    Medication side effects: none    Diet: low salt         Problem list and histories reviewed & adjusted, as indicated.  Additional history: as documented    Labs reviewed in EPIC    Reviewed and updated as needed this visit by clinical staff  Tobacco  Allergies  Meds  Problems  Med Hx  Surg Hx  Fam Hx  Soc Hx        Reviewed and updated as needed this visit by Provider  Tobacco  Allergies  Meds  Problems  Med Hx  Surg Hx  Fam Hx       ROS:  CONSTITUTIONAL:POSITIVE  for fever   ENT/MOUTH: POSITIVE for Hx sinus infections, nasal congestion, postnasal drainage, rhinorrhea-purulent and sinus pressure  RESP:POSITIVE for cough-productive, Hx asthma, Hx COPD and wheezing  CV: NEGATIVE for chest pain, palpitations or peripheral edema  : dysuria and frequency 1-2 hrs  ENDOCRINE: POSITIVE  for HX thyroid disease    OBJECTIVE:                                                    BP (!) 144/110 (BP Location: Left arm, Patient Position: Sitting, Cuff Size: Adult Regular)   Pulse 97   Temp 97.3  F (36.3  C) (Temporal)   Resp 20   LMP  (LMP Unknown)   SpO2 98%   There is no height or weight on file to calculate BMI.  GENERAL APPEARANCE: healthy, alert and no distress  HENT: ear canals and TM's normal, nose and mouth without ulcers or lesions and maxillary sinus tenderness bilateral  NECK: no adenopathy, no asymmetry, masses, or scars, thyroid normal to palpation and no bruits  RESP: rhonchi R mid posterior and L mid posterior, expiratory wheezes R mid posterior and L mid posterior and prolonged expiratory phase  CV: regular rates and rhythm, normal S1 S2, no S3 or S4 and no murmur, click or rub   (female): exam deferred, moderate suprapubic tenderness  MS: no CVA tenderness    Diagnostic test results:  Results for orders placed or performed in visit on 03/01/19 (from the past 24  hour(s))   UA with Microscopic reflex to Culture   Result Value Ref Range    Color Urine Yellow     Appearance Urine Clear     Glucose Urine Negative NEG^Negative mg/dL    Bilirubin Urine Negative NEG^Negative    Ketones Urine Negative NEG^Negative mg/dL    Specific Gravity Urine 1.010 1.003 - 1.035    pH Urine >=9.0 5.0 - 7.0 pH    Protein Albumin Urine >=300 (A) NEG^Negative mg/dL    Urobilinogen Urine 0.2 0.2 - 1.0 EU/dL    Nitrite Urine Positive (A) NEG^Negative    Blood Urine Negative NEG^Negative    Leukocyte Esterase Urine Small (A) NEG^Negative    Source Midstream Urine     WBC Urine 0 - 5 OTO5^0 - 5 /HPF    RBC Urine 2-5 (A) OTO2^O - 2 /HPF    Squamous Epithelial /LPF Urine Moderate (A) FEW^Few /LPF    Bacteria Urine Many (A) NEG^Negative /HPF        ASSESSMENT/PLAN:                                                        ICD-10-CM    1. Dysuria R30.0 UA with Microscopic reflex to Culture     ciprofloxacin (CIPRO) 500 MG tablet   2. Chronic obstructive pulmonary disease, unspecified COPD type (H) J44.9    3. Acute maxillary sinusitis, recurrence not specified J01.00 ciprofloxacin (CIPRO) 500 MG tablet   4. Essential hypertension, benign I10 amLODIPine (NORVASC) 5 MG tablet     lisinopril (PRINIVIL/ZESTRIL) 20 MG tablet   5. Hypothyroidism due to acquired atrophy of thyroid E03.4 levothyroxine (SYNTHROID/LEVOTHROID) 175 MCG tablet       Follow up with Provider - 2 mo    Will treat with Cipro 500 mg twice daily for 2 weeks.  Hopefully that will treat urinary and sinus and respiratory infections  Need to get back on blood pressure medication and Levothyroxine.  Will need to recheck after back on medication  Patient Instructions   Push fluids  Symptomatic treatment.  Will use saline gargles, tylenol and/or advil. Suck on  lozenges as needed. Push fluids. Salt water nasal spray as needed.  Use expectorant such as Mucinex or Robitussin for cough      Jose Roberto Gonzalez MD  [facility]

## 2019-03-01 NOTE — PATIENT INSTRUCTIONS
Push fluids  Symptomatic treatment.  Will use saline gargles, tylenol and/or advil. Suck on  lozenges as needed. Push fluids. Salt water nasal spray as needed.  Use expectorant such as Mucinex or Robitussin for cough

## 2019-03-03 LAB
BACTERIA SPEC CULT: ABNORMAL
SPECIMEN SOURCE: ABNORMAL

## 2019-03-25 ENCOUNTER — OFFICE VISIT (OUTPATIENT)
Dept: FAMILY MEDICINE | Facility: CLINIC | Age: 64
End: 2019-03-25

## 2019-03-25 VITALS
SYSTOLIC BLOOD PRESSURE: 146 MMHG | OXYGEN SATURATION: 96 % | TEMPERATURE: 98.7 F | DIASTOLIC BLOOD PRESSURE: 82 MMHG | RESPIRATION RATE: 20 BRPM | HEART RATE: 95 BPM

## 2019-03-25 DIAGNOSIS — J44.9 CHRONIC OBSTRUCTIVE PULMONARY DISEASE, UNSPECIFIED COPD TYPE (H): ICD-10-CM

## 2019-03-25 DIAGNOSIS — J32.0 CHRONIC MAXILLARY SINUSITIS: ICD-10-CM

## 2019-03-25 DIAGNOSIS — R30.0 DYSURIA: Primary | ICD-10-CM

## 2019-03-25 LAB
ALBUMIN UR-MCNC: 100 MG/DL
APPEARANCE UR: CLEAR
BACTERIA #/AREA URNS HPF: ABNORMAL /HPF
BILIRUB UR QL STRIP: NEGATIVE
COLOR UR AUTO: YELLOW
GLUCOSE UR STRIP-MCNC: NEGATIVE MG/DL
HGB UR QL STRIP: NEGATIVE
KETONES UR STRIP-MCNC: NEGATIVE MG/DL
LEUKOCYTE ESTERASE UR QL STRIP: NEGATIVE
NITRATE UR QL: NEGATIVE
NON-SQ EPI CELLS #/AREA URNS LPF: ABNORMAL /LPF
PH UR STRIP: 5.5 PH (ref 5–7)
RBC #/AREA URNS AUTO: ABNORMAL /HPF
SOURCE: ABNORMAL
SP GR UR STRIP: 1.02 (ref 1–1.03)
UROBILINOGEN UR STRIP-ACNC: 0.2 EU/DL (ref 0.2–1)
WBC #/AREA URNS AUTO: ABNORMAL /HPF

## 2019-03-25 PROCEDURE — 99214 OFFICE O/P EST MOD 30 MIN: CPT | Performed by: FAMILY MEDICINE

## 2019-03-25 PROCEDURE — 81001 URINALYSIS AUTO W/SCOPE: CPT | Performed by: FAMILY MEDICINE

## 2019-03-25 RX ORDER — DOXYCYCLINE 100 MG/1
100 CAPSULE ORAL 2 TIMES DAILY
Qty: 20 CAPSULE | Refills: 0 | Status: SHIPPED | OUTPATIENT
Start: 2019-03-25 | End: 2019-01-01

## 2019-03-25 RX ORDER — CLINDAMYCIN HCL 150 MG
150 CAPSULE ORAL 3 TIMES DAILY
Qty: 30 CAPSULE | Refills: 0 | Status: SHIPPED | OUTPATIENT
Start: 2019-03-25 | End: 2020-01-01

## 2019-03-25 NOTE — PROGRESS NOTES
SUBJECTIVE:   Angela Salguero is a 63 year old female who presents to clinic today for the following health issues:      Improvement in UTI symptoms but continues to have burning sensation.     URINARY TRACT SYMPTOMS      Duration: since Nov    Description  dysuria, odor and back pain    Intensity:  moderate, severe    Accompanying signs and symptoms:  Fever/chills: no   Flank pain no   Nausea and vomiting: YES-vomiting  Vaginal symptoms: odor  Abdominal/Pelvic Pain: no     History  History of frequent UTI's: no   History of kidney stones: YES  Sexually Active: no   Possibility of pregnancy: No    Precipitating or alleviating factors: None    Therapies tried and outcome: Cipro      RESPIRATORY SYMPTOMS      Duration: 3 months    Description  facial pain/pressure and wheezing    Severity: severe    Accompanying signs and symptoms: UTI    History (predisposing factors):  asthma and tobacco abuse    Precipitating or alleviating factors: None    Therapies tried and outcome:  Nebulizer, Inhaler, Vicks    Increased sinus pressure and drainage    Pt very frustrated with trying to care for her aunt.  Aunt has been refusing to eat most foods, has been losing weight.            Problem list and histories reviewed & adjusted, as indicated.  Additional history: as documented    Labs reviewed in EPIC    Reviewed and updated as needed this visit by clinical staff  Tobacco  Allergies  Meds  Med Hx  Surg Hx  Fam Hx  Soc Hx      Reviewed and updated as needed this visit by Provider         ROS:  CONSTITUTIONAL:NEGATIVE for fever, chills, change in weight  ENT/MOUTH: POSITIVE for Hx sinus infections, nasal congestion, postnasal drainage, rhinorrhea-purulent and sinus pressure  RESP:POSITIVE for cough-productive and Hx asthma  : dysuria and frequency 1-2 hrs    OBJECTIVE:                                                    /82 (BP Location: Right arm, Patient Position: Sitting, Cuff Size: Adult Large)   Pulse  95   Temp 98.7  F (37.1  C) (Temporal)   Resp 20   LMP  (LMP Unknown)   SpO2 96%   There is no height or weight on file to calculate BMI.  GENERAL APPEARANCE: healthy, alert and no distress  HENT: ear canals and TM's normal, nose and mouth without ulcers or lesions, oral mucous membranes moist, oropharynx clear and maxillary sinus tenderness bilateral  RESP: rhonchi R mid posterior and L mid posterior and expiratory wheezes R mid posterior and L mid posterior  CV: regular rates and rhythm, normal S1 S2, no S3 or S4 and no murmur, click or rub   (female): exam deferred, no suprapubic tenderness    Diagnostic test results:  Results for orders placed or performed in visit on 03/25/19 (from the past 24 hour(s))   *UA reflex to Microscopic and Culture ([place] and [facility] (except [place] and [place])   Result Value Ref Range    Color Urine Yellow     Appearance Urine Clear     Glucose Urine Negative NEG^Negative mg/dL    Bilirubin Urine Negative NEG^Negative    Ketones Urine Negative NEG^Negative mg/dL    Specific Gravity Urine 1.025 1.003 - 1.035    Blood Urine Negative NEG^Negative    pH Urine 5.5 5.0 - 7.0 pH    Protein Albumin Urine 100 (A) NEG^Negative mg/dL    Urobilinogen Urine 0.2 0.2 - 1.0 EU/dL    Nitrite Urine Negative NEG^Negative    Leukocyte Esterase Urine Negative NEG^Negative    Source Midstream Urine    Urine Microscopic   Result Value Ref Range    WBC Urine 0 - 5 OTO5^0 - 5 /HPF    RBC Urine O - 2 OTO2^O - 2 /HPF    Squamous Epithelial /LPF Urine Few FEW^Few /LPF    Bacteria Urine Few (A) NEG^Negative /HPF        ASSESSMENT/PLAN:                                                        ICD-10-CM    1. Dysuria R30.0 *UA reflex to Microscopic and Culture ([place] and [facility] (except [place] and [place])     Urine Microscopic   2. Chronic maxillary sinusitis J32.0 doxycycline hyclate (VIBRAMYCIN) 100 MG capsule     clindamycin (CLEOCIN) 150 MG capsule   3. Chronic obstructive  pulmonary disease, unspecified COPD type (H) J44.9        Follow up with Provider - 1 mo or as needed    There are no Patient Instructions on file for this visit.    Jose Roberto Gonzalez MD  [facility]

## 2019-10-14 NOTE — PROGRESS NOTES
Subjective     Angela Salguero is a 64 year old female who presents to clinic today for the following health issues:    HPI   Acute Illness   Acute illness concerns: cold  Onset: ongoing    Fever: no    Chills/Sweats: YES    Headache (location?): YES    Sinus Pressure:YES    Conjunctivitis:  YES-     Ear Pain: no    Rhinorrhea: YES    Congestion: YES    Sore Throat: no     Cough: YES-productive of yellow sputum    Wheeze: YES    Decreased Appetite: no    Nausea: no    Vomiting: no    Diarrhea:  no    Dysuria/Freq.: YES    Fatigue/Achiness: YES    Sick/Strep Exposure: YES     Therapies Tried and outcome: na    URINARY TRACT SYMPTOMS      Duration: couple weeks    Description  dysuria, frequency, odor, back pain and cloudy    Intensity:  moderate    Accompanying signs and symptoms:  Fever/chills: no   Flank pain YES  Nausea and vomiting: no   Vaginal symptoms: odor  Abdominal/Pelvic Pain: YES    History  History of frequent UTI's: YES  History of kidney stones: no   Sexually Active: no   Possibility of pregnancy: No    Precipitating or alleviating factors: None    Therapies tried and outcome: none   Outcome: na      Has HTN and hypothyroidism.  Declines labs because she can not afford to do labs; does not have insurance to pay for them.      Reviewed and updated as needed this visit by Provider  Tobacco  Allergies  Meds  Problems  Med Hx  Surg Hx  Fam Hx         Review of Systems   C: NEGATIVE for change in weight  I: NEGATIVE for worrisome rashes, moles or lesions  E: NEGATIVE for vision changes or irritation  CV: NEGATIVE for chest pain, palpitations or peripheral edema  GI: NEGATIVE for nausea, heartburn, or change in bowel habits  H: NEGATIVE for bleeding problems        Objective    BP (!) 148/84   Pulse 104   Temp 99.1  F (37.3  C) (Tympanic)   Resp 16   LMP  (LMP Unknown)   SpO2 95%   There is no height or weight on file to calculate BMI.  Physical Exam   GENERAL: alert and no  distress  EYES: Eyes grossly normal to inspection, PERRL and conjunctivae and sclerae normal  HENT: ear canals and TM's normal, mouth without ulcers or lesions.  +b/l boggy turbinates, no active nasal drainage.  NECK: no adenopathy, no asymmetry, masses, or scars and thyroid normal to palpation  RESP: +rhonchi and expiratory wheezes on auscultation.  No rales.    CV: +tachycardic.  Regular rhythm, normal S1 S2, no S3 or S4, no murmur, click or rub, no peripheral edema and peripheral pulses strong  SKIN: no suspicious lesions or rashes      Diagnostic Test Results:  Labs reviewed in Epic        Assessment & Plan   Problem List Items Addressed This Visit     Essential hypertension, benign      Other Visit Diagnoses     Bronchitis    -  Primary    Relevant Medications    clindamycin (CLEOCIN) 150 MG capsule    doxycycline hyclate (VIBRAMYCIN) 100 MG capsule    predniSONE (DELTASONE) 20 MG tablet    Acute non-recurrent maxillary sinusitis        Relevant Medications    ciprofloxacin (CIPRO) 250 MG tablet    clindamycin (CLEOCIN) 150 MG capsule    doxycycline hyclate (VIBRAMYCIN) 100 MG capsule    predniSONE (DELTASONE) 20 MG tablet    Acute cystitis without hematuria        Relevant Medications    ciprofloxacin (CIPRO) 250 MG tablet    Other Relevant Orders    UA reflex to Microscopic and Culture (Completed)    Urine Microscopic (Completed)    Urine Culture Aerobic Bacterial (Completed)           --COPD/bronchitis flare-up with ongoing tobacco use and sinusitis: worsening  Continue Albuterol PRN and Dulera daily  Has several allergies and intolerances, so only certain med regimens work to help control her COPD flare-ups.  Pt requesting Clindamycin and Doxycyline.  Also requesting Prednisone taper    --HTN: not well controlled.  With hx hypothyroid: unsure how controlled she is due to no recent lab work available.  Takes Amlodipine 5 mg daily, Lisinopril 20 mg daily  Last TSH/T4 done in 2016?  On Levothyroxine 175 mcg  daily  Past due for lab work but pt continues to decline labs--tells me she does not have insurance, so can't afford to pay for labs and take care of her health in general.      Tobacco Cessation:   reports that she has been smoking cigarettes. She has been smoking about 1.00 pack per day. She has never used smokeless tobacco.  Tobacco Cessation Action Plan: Information offered: Patient not interested at this time        See Patient Instructions  Return in about 1 week (around 10/21/2019) for re-check / follow-up.    Dian Tai, DO  [facility]

## 2020-01-01 ENCOUNTER — VIRTUAL VISIT (OUTPATIENT)
Dept: FAMILY MEDICINE | Facility: CLINIC | Age: 65
End: 2020-01-01

## 2020-01-01 ENCOUNTER — TELEPHONE (OUTPATIENT)
Dept: FAMILY MEDICINE | Facility: CLINIC | Age: 65
End: 2020-01-01

## 2020-01-01 ENCOUNTER — OFFICE VISIT (OUTPATIENT)
Dept: FAMILY MEDICINE | Facility: CLINIC | Age: 65
End: 2020-01-01

## 2020-01-01 ENCOUNTER — OFFICE VISIT (OUTPATIENT)
Dept: FAMILY MEDICINE | Facility: CLINIC | Age: 65
End: 2020-01-01
Payer: COMMERCIAL

## 2020-01-01 VITALS
DIASTOLIC BLOOD PRESSURE: 80 MMHG | HEART RATE: 82 BPM | SYSTOLIC BLOOD PRESSURE: 132 MMHG | TEMPERATURE: 98.4 F | OXYGEN SATURATION: 96 %

## 2020-01-01 VITALS
OXYGEN SATURATION: 96 % | DIASTOLIC BLOOD PRESSURE: 80 MMHG | SYSTOLIC BLOOD PRESSURE: 110 MMHG | RESPIRATION RATE: 16 BRPM | HEART RATE: 60 BPM | TEMPERATURE: 98.1 F

## 2020-01-01 DIAGNOSIS — I10 ESSENTIAL HYPERTENSION, BENIGN: ICD-10-CM

## 2020-01-01 DIAGNOSIS — J44.9 CHRONIC OBSTRUCTIVE PULMONARY DISEASE, UNSPECIFIED COPD TYPE (H): ICD-10-CM

## 2020-01-01 DIAGNOSIS — J32.0 CHRONIC MAXILLARY SINUSITIS: Primary | ICD-10-CM

## 2020-01-01 DIAGNOSIS — R30.0 DYSURIA: ICD-10-CM

## 2020-01-01 DIAGNOSIS — J44.1 CHRONIC OBSTRUCTIVE PULMONARY DISEASE WITH ACUTE EXACERBATION (H): ICD-10-CM

## 2020-01-01 DIAGNOSIS — E03.4 HYPOTHYROIDISM DUE TO ACQUIRED ATROPHY OF THYROID: ICD-10-CM

## 2020-01-01 DIAGNOSIS — J44.1 CHRONIC OBSTRUCTIVE PULMONARY DISEASE WITH ACUTE EXACERBATION (H): Primary | ICD-10-CM

## 2020-01-01 DIAGNOSIS — R30.0 DYSURIA: Primary | ICD-10-CM

## 2020-01-01 DIAGNOSIS — J32.0 CHRONIC MAXILLARY SINUSITIS: ICD-10-CM

## 2020-01-01 LAB
ALBUMIN UR-MCNC: 30 MG/DL
ALBUMIN UR-MCNC: ABNORMAL MG/DL
APPEARANCE UR: CLEAR
APPEARANCE UR: CLEAR
BILIRUB UR QL STRIP: NEGATIVE
BILIRUB UR QL STRIP: NEGATIVE
COLOR UR AUTO: YELLOW
COLOR UR AUTO: YELLOW
GLUCOSE UR STRIP-MCNC: NEGATIVE MG/DL
GLUCOSE UR STRIP-MCNC: NEGATIVE MG/DL
HGB UR QL STRIP: NEGATIVE
HGB UR QL STRIP: NEGATIVE
KETONES UR STRIP-MCNC: NEGATIVE MG/DL
KETONES UR STRIP-MCNC: NEGATIVE MG/DL
LEUKOCYTE ESTERASE UR QL STRIP: NEGATIVE
LEUKOCYTE ESTERASE UR QL STRIP: NEGATIVE
NITRATE UR QL: NEGATIVE
NITRATE UR QL: NEGATIVE
NON-SQ EPI CELLS #/AREA URNS LPF: ABNORMAL /LPF
PH UR STRIP: 6 PH (ref 5–7)
PH UR STRIP: 7 PH (ref 5–7)
RBC #/AREA URNS AUTO: ABNORMAL /HPF
RBC #/AREA URNS AUTO: NORMAL /HPF
SOURCE: ABNORMAL
SOURCE: ABNORMAL
SP GR UR STRIP: 1.02 (ref 1–1.03)
SP GR UR STRIP: 1.02 (ref 1–1.03)
T4 FREE SERPL-MCNC: 1.04 NG/DL (ref 0.76–1.46)
TSH SERPL DL<=0.005 MIU/L-ACNC: 7.09 MU/L (ref 0.4–4)
UROBILINOGEN UR STRIP-ACNC: 0.2 EU/DL (ref 0.2–1)
UROBILINOGEN UR STRIP-ACNC: 0.2 EU/DL (ref 0.2–1)
WBC #/AREA URNS AUTO: ABNORMAL /HPF
WBC #/AREA URNS AUTO: NORMAL /HPF

## 2020-01-01 PROCEDURE — 84443 ASSAY THYROID STIM HORMONE: CPT | Performed by: FAMILY MEDICINE

## 2020-01-01 PROCEDURE — 99214 OFFICE O/P EST MOD 30 MIN: CPT | Performed by: FAMILY MEDICINE

## 2020-01-01 PROCEDURE — 81001 URINALYSIS AUTO W/SCOPE: CPT | Performed by: FAMILY MEDICINE

## 2020-01-01 PROCEDURE — 99214 OFFICE O/P EST MOD 30 MIN: CPT | Mod: 95 | Performed by: FAMILY MEDICINE

## 2020-01-01 PROCEDURE — 84439 ASSAY OF FREE THYROXINE: CPT | Performed by: FAMILY MEDICINE

## 2020-01-01 PROCEDURE — 36415 COLL VENOUS BLD VENIPUNCTURE: CPT | Performed by: FAMILY MEDICINE

## 2020-01-01 RX ORDER — CLINDAMYCIN HCL 300 MG
300 CAPSULE ORAL 3 TIMES DAILY
Qty: 30 CAPSULE | Refills: 0 | Status: SHIPPED | OUTPATIENT
Start: 2020-01-01 | End: 2020-01-01

## 2020-01-01 RX ORDER — PREDNISONE 20 MG/1
TABLET ORAL
Qty: 25 TABLET | Refills: 0 | Status: SHIPPED | OUTPATIENT
Start: 2020-01-01 | End: 2020-01-01

## 2020-01-01 RX ORDER — PREDNISONE 20 MG/1
TABLET ORAL
Qty: 25 TABLET | Refills: 0 | Status: SHIPPED | OUTPATIENT
Start: 2020-01-01

## 2020-01-01 RX ORDER — DOXYCYCLINE 100 MG/1
100 CAPSULE ORAL 2 TIMES DAILY
Qty: 20 CAPSULE | Refills: 0 | Status: SHIPPED | OUTPATIENT
Start: 2020-01-01

## 2020-01-01 RX ORDER — LEVOTHYROXINE SODIUM 175 UG/1
175 TABLET ORAL DAILY
Qty: 30 TABLET | Refills: 0 | Status: SHIPPED | OUTPATIENT
Start: 2020-01-01 | End: 2020-01-01

## 2020-01-01 RX ORDER — DOXYCYCLINE 100 MG/1
100 CAPSULE ORAL 2 TIMES DAILY
Qty: 20 CAPSULE | Refills: 0 | Status: SHIPPED | OUTPATIENT
Start: 2020-01-01 | End: 2020-01-01

## 2020-01-01 RX ORDER — IPRATROPIUM BROMIDE AND ALBUTEROL SULFATE 2.5; .5 MG/3ML; MG/3ML
1 SOLUTION RESPIRATORY (INHALATION) EVERY 6 HOURS PRN
Qty: 360 ML | Refills: 0 | Status: SHIPPED | OUTPATIENT
Start: 2020-01-01 | End: 2020-01-01

## 2020-01-01 RX ORDER — CIPROFLOXACIN 500 MG/1
500 TABLET, FILM COATED ORAL 2 TIMES DAILY
Qty: 20 TABLET | Refills: 0 | Status: SHIPPED | OUTPATIENT
Start: 2020-01-01 | End: 2020-01-01

## 2020-01-01 RX ORDER — CLINDAMYCIN HCL 300 MG
300 CAPSULE ORAL 3 TIMES DAILY
Qty: 30 CAPSULE | Refills: 0 | Status: SHIPPED | OUTPATIENT
Start: 2020-01-01

## 2020-01-01 RX ORDER — LISINOPRIL 20 MG/1
TABLET ORAL
Qty: 90 TABLET | Refills: 0 | Status: SHIPPED | OUTPATIENT
Start: 2020-01-01 | End: 2020-01-01

## 2020-01-23 NOTE — TELEPHONE ENCOUNTER
Routing refill request to provider for review/approval because:  Labs out of range: CR  BP above 140/90  FYI -pt has a future appt scheduled.

## 2020-01-23 NOTE — TELEPHONE ENCOUNTER
Reason for Call:  Medication or medication refill:    Do you use a [facility]?  Name of the pharmacy and phone number for the current request:  [facility], [address]      Name of the medication requested:   lisinopril (PRINIVIL/ZESTRIL) 20 MG tablet    Other request: none    Can we leave a detailed message on this number? YES    Phone number patient can be reached at: Cell number on file:    Telephone Information:   Mobile [phone]       Best Time: any    Call taken on 1/23/2020 at 10:16 AM by Joselyn Gooden

## 2020-01-23 NOTE — TELEPHONE ENCOUNTER
"Requested Prescriptions   Pending Prescriptions Disp Refills     lisinopril (PRINIVIL/ZESTRIL) 20 MG tablet    Last Written Prescription Date:  03/01/2019  Last Fill Quantity: 90 tablet,  # refills: 3   Last office visit: 10/14/2019 with prescribing provider:  Abida   Future Office Visit:     Next 5 appointments (look out 90 days)    Feb 06, 2020  3:15 PM CST  Office Visit with Jose Roberto Gonzalez MD  [facility] ([facility] [address] [place] [id number]  [phone]          90 tablet 3     Sig: TAKE JUST 1 TABLET BY MOUTH DAILY       ACE Inhibitors (Including Combos) Protocol Failed - 1/23/2020 10:17 AM        Failed - Blood pressure under 140/90 in past 12 months     BP Readings from Last 3 Encounters:   10/14/19 (!) 148/84   03/25/19 146/82   03/01/19 (!) 144/110                 Failed - Normal serum creatinine on file in past 12 months     Recent Labs   Lab Test 05/04/17  1712   CR 1.20*             Failed - Normal serum potassium on file in past 12 months     Recent Labs   Lab Test 05/04/17  1712   POTASSIUM 4.0             Passed - Recent (12 mo) or future (30 days) visit within the authorizing provider's specialty     Patient has had an office visit with the authorizing provider or a provider within the authorizing providers department within the previous 12 mos or has a future within next 30 days. See \"Patient Info\" tab in inbasket, or \"Choose Columns\" in Meds & Orders section of the refill encounter.              Passed - Medication is active on med list        Passed - Patient is age 18 or older        Passed - No active pregnancy on record        Passed - No positive pregnancy test within past 12 months           "

## 2020-02-06 NOTE — PATIENT INSTRUCTIONS
Symptomatic treatment.  Will use saline gargles, tylenol and/or advil. Suck on  lozenges as needed. Push fluids. Salt water nasal spray as needed.  Use expectorant such as Mucinex or Robitussin for cough and congestion

## 2020-02-06 NOTE — PROGRESS NOTES
Subjective     Angela Salguero is a 64 year old female who presents to clinic today for the following health issues:    HPI     Pt states having issues with chronic sinus problems and uti, not enough time inday to pee  Thinks bronchitis is back    Pt continues to smoke, same amount.  She has no desire to quit smoking       URINARY TRACT SYMPTOMS      Duration: since Nov    Description  dysuria, odor and back pain    Intensity:  moderate, severe    Accompanying signs and symptoms:  Fever/chills: no   Flank pain no   Nausea and vomiting: YES-vomiting  Vaginal symptoms: odor  Abdominal/Pelvic Pain: no     History  History of frequent UTI's: no   History of kidney stones: YES  Sexually Active: no   Possibility of pregnancy: No    Precipitating or alleviating factors: None    Therapies tried and outcome: Cipro      RESPIRATORY SYMPTOMS      Duration: 3 months    Description  facial pain/pressure and wheezing    Severity: severe    Accompanying signs and symptoms: UTI    History (predisposing factors):  asthma and tobacco abuse    Precipitating or alleviating factors: None    Therapies tried and outcome:  Nebulizer, Inhaler, Vicks    Increased sinus pressure and drainage    Pt very frustrated with trying to care for her aunt.  Aunt has been refusing to eat most foods, has been losing weight.            Problem list and histories reviewed & adjusted, as indicated.  Additional history: as documented    Labs reviewed in EPIC    Reviewed and updated as needed this visit by clinical staff  Tobacco  Allergies  Meds       Reviewed and updated as needed this visit by Provider         ROS:  CONSTITUTIONAL:NEGATIVE for fever, chills, change in weight  ENT/MOUTH: POSITIVE for Hx sinus infections, nasal congestion, postnasal drainage, rhinorrhea-purulent and sinus pressure  RESP:POSITIVE for cough-productive and Hx asthma  : dysuria and frequency 1-2 hrs    OBJECTIVE:                                                     /80 (Cuff Size: Adult Large)   Pulse 82   Temp 98.4  F (36.9  C) (Tympanic)   LMP  (LMP Unknown)   SpO2 96%   There is no height or weight on file to calculate BMI.  GENERAL APPEARANCE: healthy, alert and no distress  HENT: ear canals and TM's normal, nose and mouth without ulcers or lesions, oral mucous membranes moist, oropharynx clear and maxillary sinus tenderness bilateral  RESP: rhonchi R mid posterior and L mid posterior and expiratory wheezes R mid posterior and L mid posterior  CV: regular rates and rhythm, normal S1 S2, no S3 or S4 and no murmur, click or rub   (female): exam deferred, no suprapubic tenderness    Diagnostic test results:  Lab: see below, results pending     ASSESSMENT/PLAN:                                                        ICD-10-CM    1. Chronic maxillary sinusitis J32.0 ciprofloxacin (CIPRO) 500 MG tablet   2. Chronic obstructive pulmonary disease, unspecified COPD type (H) J44.9    3. Essential hypertension, benign I10    4. Dysuria R30.0 *UA reflex to Microscopic and Culture ([facility] (except [place] and [place])     ciprofloxacin (CIPRO) 500 MG tablet   5. Hypothyroidism due to acquired atrophy of thyroid E03.4 **TSH with free T4 reflex FUTURE anytime       Follow up with Provider - 1 mo if not improving    Patient Instructions   Symptomatic treatment.  Will use saline gargles, tylenol and/or advil. Suck on  lozenges as needed. Push fluids. Salt water nasal spray as needed.  Use expectorant such as Mucinex or Robitussin for cough and congestion      Jose Roberto Gonzalez MD  [facility]

## 2020-04-03 NOTE — TELEPHONE ENCOUNTER
Reason for Call:  Medication or medication refill:  Do you use a [facility]?  Name of the pharmacy and phone number for the current request:  [id number] - [place], [place] - [address]  [phone]  Name of the medication requested: levothyroxine (SYNTHROID/LEVOTHROID) 175 MCG tablet  Other request: NONE  Can we leave a detailed message on this number? YES  Phone number patient can be reached at: Home number on file [phone] (home)  Best Time: ANY  Call taken on 4/3/2020 at 3:01 PM by SCOTT GODOY

## 2020-04-03 NOTE — TELEPHONE ENCOUNTER
"Requested Prescriptions   Pending Prescriptions Disp Refills     levothyroxine (SYNTHROID/LEVOTHROID) 175 MCG tablet    Last Written Prescription Date:  08/19/2019  Last Fill Quantity: 30 tablet,  # refills: 0   Last office visit: 2/6/2020 with prescribing provider:  Lisa   Future Office Visit:     30 tablet 0     Sig: Take 1 tablet (175 mcg) by mouth daily       Thyroid Protocol Failed - 4/3/2020  3:02 PM        Failed - Normal TSH on file in past 12 months     Recent Labs   Lab Test 02/06/20  1538   TSH 7.09*              Passed - Patient is 12 years or older        Passed - Recent (12 mo) or future (30 days) visit within the authorizing provider's specialty     Patient has had an office visit with the authorizing provider or a provider within the authorizing providers department within the previous 12 mos or has a future within next 30 days. See \"Patient Info\" tab in inbasket, or \"Choose Columns\" in Meds & Orders section of the refill encounter.              Passed - Medication is active on med list        Passed - No active pregnancy on record     If patient is pregnant or has had a positive pregnancy test, please check TSH.          Passed - No positive pregnancy test in past 12 months     If patient is pregnant or has had a positive pregnancy test, please check TSH.                "

## 2020-04-22 NOTE — PROGRESS NOTES
"Angela Salguero is a 64 year old female who is being evaluated via a billable telephone visit.      The patient has been notified of following:     \"This telephone visit will be conducted via a call between you and your physician/provider. We have found that certain health care needs can be provided without the need for a physical exam.  This service lets us provide the care you need with a short phone conversation.  If a prescription is necessary we can send it directly to your pharmacy.  If lab work is needed we can place an order for that and you can then stop by our lab to have the test done at a later time.    Telephone visits are billed at different rates depending on your insurance coverage. During this emergency period, for some insurers they may be billed the same as an in-person visit.  Please reach out to your insurance provider with any questions.    If during the course of the call the physician/provider feels a telephone visit is not appropriate, you will not be charged for this service.\"    Patient has given verbal consent for Telephone visit?  Yes    How would you like to obtain your AVS? Mail a copy    Subjective     Angela Salguero is a 64 year old female who presents to clinic today for the following health issues:    HPI  COPD Follow-Up    Overall, how are your COPD symptoms since your last clinic visit?  Much worse    How much fatigue or shortness of breath do you have when you are walking?  More than usual    How much shortness of breath do you have when you are resting?  Same as usual    How often do you cough? Often    Have you noticed any change in your sputum/phlegm?  No    Have you experienced a recent fever? No    Please describe how far you can walk without stopping to rest:  Walks at work    How many flights of stairs are you able to walk up without stopping?  1    Have you had any Emergency Room Visits, Urgent Care Visits, or Hospital Admissions because of your COPD " since your last office visit?  No    History   Smoking Status     Current Every Day Smoker     Packs/day: 1.00     Types: Cigarettes   Smokeless Tobacco     Never Used     Lab Results   Component Value Date    FEV1 2.31 07/17/2014    IWE0NAR 82 07/17/2014     Worsening sinus congestion, pain, facial pressure  Cough worse, tight more congested      How many servings of fruits and vegetables do you eat daily?  2-3    On average, how many sweetened beverages do you drink each day (Examples: soda, juice, sweet tea, etc.  Do NOT count diet or artificially sweetened beverages)?   1    How many days per week do you exercise enough to make your heart beat faster? 3 or less    How many minutes a day do you exercise enough to make your heart beat faster? 9 or less    How many days per week do you miss taking your medication? 0     Her aunt, Antonella is getting much worse, not eating. Incontinent of urine and stool.  Not getting out of bed almost at all          Allergies   Allergen Reactions     Ambien      Diltiazem      Erythromycin      Levaquin      Macrodantin [Nitrofurantoin Macrocrystal]      Penicillin G      Zithromax [Azithromycin Dihydrate]      Bactrim [Sulfamethoxazole W/Trimethoprim] Rash       Reviewed and updated as needed this visit by Provider         Review of Systems   ROS COMP: CONSTITUTIONAL: NEGATIVE for fever, chills, change in weight  ENT/MOUTH: POSITIVE for Hx sinus infections, nasal congestion, postnasal drainage, rhinorrhea-purulent and sinus pressure  RESP:POSITIVE for cough-productive, dyspnea on exertion, Hx COPD and wheezing  CV: NEGATIVE for chest pain, palpitations or peripheral edema       Objective   Reported vitals:  LMP  (LMP Unknown)    healthy, alert and no distress  PSYCH: Alert and oriented times 3; coherent speech, normal   rate and volume, able to articulate logical thoughts, able   to abstract reason, no tangential thoughts, no hallucinations   or delusions  Her affect is normal  and pleasant  RESP: No cough,  Wheezing present over phone, able to talk in full sentences  Remainder of exam unable to be completed due to telephone visits    Diagnostic Test Results:  Labs reviewed in Epic  none         Assessment/Plan:  1. Chronic maxillary sinusitis  worsened  - clindamycin (CLEOCIN) 300 MG capsule; Take 1 capsule (300 mg) by mouth 3 times daily  Dispense: 30 capsule; Refill: 0    2. Chronic obstructive pulmonary disease with acute exacerbation (H)  Worsened flare up  - doxycycline hyclate (VIBRAMYCIN) 100 MG capsule; Take 1 capsule (100 mg) by mouth 2 times daily  Dispense: 20 capsule; Refill: 0  - predniSONE (DELTASONE) 20 MG tablet; Take 3 daily for 3 days, then 2 daily for 4 days, then 1 daily  Dispense: 25 tablet; Refill: 0    Return in about 3 weeks (around 5/13/2020), or if symptoms worsen or fail to improve, for sinus infection, COPD exacerbation.    Advised that she needs to get her aunt to hospital, will need to call ambulance to have her transported    Phone call duration:  14 minutes    Jose Roberto Gonzalez MD

## 2020-04-27 NOTE — TELEPHONE ENCOUNTER
Last virtual visit 04/22/20.  All meds helping for treatment of COPD.  Symptoms not worse from virtual visit. Pt is getting better she would just like her nebulizer medications.     Covering team please approve medication as appropriate.

## 2020-04-27 NOTE — TELEPHONE ENCOUNTER
Reason for Call:  Other call back    Detailed comments: patient is requesting Albuterol and Ipratropium. She states she hasn't had a prescription for these in a few years but needs them at this time. The pharmacy she uses is [facility] in [place]. Please call her back.    Phone Number Patient can be reached at: Home number on file [phone] (home)    Best Time: anytime    Can we leave a detailed message on this number? YES    Call taken on 4/27/2020 at 12:10 PM by Mirian Dutton

## 2020-04-27 NOTE — TELEPHONE ENCOUNTER
Patient Contact    Attempt # 1    Was call answered?  No.  Left message on voicemail with information to call me back.    On call back:    -Triage pt

## 2020-06-11 NOTE — PROGRESS NOTES
Subjective     Angela Salguero is a 64 year old female who presents to clinic today for the following health issues:    HPI   ENT Symptoms             Symptoms: cc Present Absent Comment   Fever/Chills  x  Low grade   Fatigue  x     Muscle Aches   x    Eye Irritation   x    Sneezing   x    Nasal Robert/Drg  x     Sinus Pressure/Pain  x     Loss of smell   x    Dental pain   x    Sore Throat  x  Post nasal drip   Swollen Glands   x    Ear Pain/Fullness  x     Cough  x     Wheeze   x    Chest Pain   x    Shortness of breath  x     Rash       Other         Symptom duration:  chronic ongoing issue   Symptom severity:  moderate   Treatments tried:  mucinex and robitussin, prednisone and clindamycin a few months ago   Contacts:  n/a   Pt continues to smoke.  Has cut down slightly     URINARY TRACT SYMPTOMS  Onset: ongoing    Description:   Painful urination (Dysuria): YES- burning           Frequency: YES  Blood in urine (Hematuria): no   Delay in urine (Hesitency): no     Intensity: moderate    Progression of Symptoms:  same    Accompanying Signs & Symptoms:  Fever/chills: YES  Flank pain YES  Nausea and vomiting: nausea   Any vaginal symptoms: vaginal odor  Abdominal/Pelvic Pain: YES    History:   History of frequent UTI's: yes-chronic issue for pt   History of kidney stones: no   Sexually Active: no   Possibility of pregnancy: No    Precipitating factors:       Therapies Tried and outcome: azo      BP Readings from Last 3 Encounters:   06/12/20 110/80   02/06/20 132/80   10/14/19 (!) 148/84    Wt Readings from Last 3 Encounters:   04/18/16 83.9 kg (185 lb)   04/08/16 83.9 kg (185 lb)   02/29/16 83.9 kg (185 lb)                      Reviewed and updated as needed this visit by Provider         Review of Systems   CONSTITUTIONAL: NEGATIVE for fever, chills, change in weight  ENT/MOUTH: POSITIVE for Hx sinus infections, nasal congestion, postnasal drainage, rhinorrhea-purulent and sinus pressure  RESP:POSITIVE for  cough-productive, Hx COPD and wheezing  CV: NEGATIVE for chest pain, palpitations or peripheral edema  : dysuria and frequency 2-3 hrs      Objective    /80   Pulse 60   Temp 98.1  F (36.7  C) (Tympanic)   Resp 16   LMP  (LMP Unknown)   SpO2 96%   There is no height or weight on file to calculate BMI.  Physical Exam   GENERAL: healthy, alert and no distress  HENT: normal cephalic/atraumatic, ear canals and TM's normal, nose and mouth without ulcers or lesions, oropharynx clear, oral mucous membranes moist and sinuses: maxillary tenderness on bilateral  NECK: no adenopathy, no asymmetry, masses, or scars and thyroid normal to palpation  RESP: rhonchi R mid posterior and L mid posterior, expiratory wheezes R mid posterior and L mid posterior and prolonged expiratory phase  CV: regular rate and rhythm, normal S1 S2, no S3 or S4, no murmur, click or rub, no peripheral edema and peripheral pulses strong  ABDOMEN: soft, nontender, no hepatosplenomegaly, no masses and bowel sounds normal    Diagnostic Test Results:  Labs reviewed in Epic  Results for orders placed or performed in visit on 06/12/20 (from the past 24 hour(s))   UA with Microscopic reflex to Culture    Specimen: Midstream Urine   Result Value Ref Range    Color Urine Yellow     Appearance Urine Clear     Glucose Urine Negative NEG^Negative mg/dL    Bilirubin Urine Negative NEG^Negative    Ketones Urine Negative NEG^Negative mg/dL    Specific Gravity Urine 1.025 1.003 - 1.035    pH Urine 6.0 5.0 - 7.0 pH    Protein Albumin Urine Trace (A) NEG^Negative mg/dL    Urobilinogen Urine 0.2 0.2 - 1.0 EU/dL    Nitrite Urine Negative NEG^Negative    Blood Urine Negative NEG^Negative    Leukocyte Esterase Urine Negative NEG^Negative    Source Midstream Urine     WBC Urine 0 - 5 OTO5^0 - 5 /HPF    RBC Urine O - 2 OTO2^O - 2 /HPF    Squamous Epithelial /LPF Urine Moderate (A) FEW^Few /LPF           Assessment & Plan     Angela was seen today for uti and  uri.    Diagnoses and all orders for this visit:    Dysuria  -     UA with Microscopic reflex to Culture    Chronic maxillary sinusitis  -     clindamycin (CLEOCIN) 300 MG capsule; Take 1 capsule (300 mg) by mouth 3 times daily    Chronic obstructive pulmonary disease, unspecified COPD type (H)    Chronic obstructive pulmonary disease with acute exacerbation (H)  -     doxycycline hyclate (VIBRAMYCIN) 100 MG capsule; Take 1 capsule (100 mg) by mouth 2 times daily  -     predniSONE (DELTASONE) 20 MG tablet; Take 3 daily for 3 days, then 2 daily for 4 days, then 1 daily           FUTURE APPOINTMENTS:       - Follow-up visit in 1 mo if not resolving  Patient Instructions   Keep pushing fluids  Work to decrease smoking, stopping smoking would be best      Return in about 1 month (around 7/12/2020), or if symptoms worsen or fail to improve, for COPD, sinusitis.    Jose Roberto Gonzalez MD  [facility]

## 2020-06-26 NOTE — TELEPHONE ENCOUNTER
Reason for Call:  Medication or medication refill:    Do you use a [facility]?  Name of the pharmacy and phone number for the current request:       [id number] - [place], [place] - [address]      Name of the medication requested:     Other request: The patient is requesting a refill for these medications. She stated that she has been unable to taper off the prednisone and she just finished the other medications and she continues to run a fever of 99.4-99.8    Can we leave a detailed message on this number? YES    Phone number patient can be reached at: Home number on file [phone] (home)    Best Time: Any    Call taken on 6/26/2020 at 11:21 AM by Nicci Thakur

## 2020-07-21 NOTE — TELEPHONE ENCOUNTER
Routing refill request to provider for review/approval because:  Labs not current:  CHUY Mccray

## 2020-11-05 ENCOUNTER — TELEPHONE (OUTPATIENT)
Dept: FAMILY MEDICINE | Facility: CLINIC | Age: 65
End: 2020-11-05

## 2020-11-05 NOTE — TELEPHONE ENCOUNTER
Pt's brother, Urbano Velazquez here at clinic for his own appointment asked if we had been notified that Angela had  suddenly 20 from presumed sudden MI.

## 2021-12-20 NOTE — TELEPHONE ENCOUNTER
lisinopril (PRINIVIL/ZESTRIL) 20 MG tablet   Last Written Prescription Date: 05/19/2017  Last Fill Quantity: 90, # refills: 0  Last Office Visit with G, [facility] or [facility] prescribing provider: 05/04/2017       Potassium   Date Value Ref Range Status   05/04/2017 4.0 3.4 - 5.3 mmol/L Final     Creatinine   Date Value Ref Range Status   05/04/2017 1.20 (H) 0.52 - 1.04 mg/dL Final     BP Readings from Last 3 Encounters:   05/04/17 (!) 160/92   04/18/16 130/82   04/08/16 130/78        FYI - any concerns with low dose amitriptyline?

## 2024-04-11 NOTE — TELEPHONE ENCOUNTER
Routing refill request to provider for review/approval because:  Labs out of range:  TSH         The [facility] \"Your Path to a More Active Life\" orthopedic total knee or total hip educational video and the [facility] [place] patient handbook provided & reviewed during the patients pre-admission testing (PAT) appointment. An opportunity for questions was provided, patient verbalized understanding.

## 2025-03-02 NOTE — PATIENT INSTRUCTIONS
Symptomatic treatment.  Will use saline gargles, tylenol and/or advil. Suck on  lozenges as needed. Push fluids. Salt water nasal spray as needed.   No